# Patient Record
Sex: FEMALE | Race: WHITE | NOT HISPANIC OR LATINO | URBAN - METROPOLITAN AREA
[De-identification: names, ages, dates, MRNs, and addresses within clinical notes are randomized per-mention and may not be internally consistent; named-entity substitution may affect disease eponyms.]

---

## 2017-02-03 ENCOUNTER — OUTPATIENT (OUTPATIENT)
Dept: OUTPATIENT SERVICES | Facility: HOSPITAL | Age: 37
LOS: 1 days | Discharge: HOME | End: 2017-02-03

## 2017-06-27 DIAGNOSIS — Z31.69 ENCOUNTER FOR OTHER GENERAL COUNSELING AND ADVICE ON PROCREATION: ICD-10-CM

## 2017-10-19 ENCOUNTER — OUTPATIENT (OUTPATIENT)
Dept: OUTPATIENT SERVICES | Facility: HOSPITAL | Age: 37
LOS: 1 days | Discharge: HOME | End: 2017-10-19

## 2017-10-19 DIAGNOSIS — O99.89 OTHER SPECIFIED DISEASES AND CONDITIONS COMPLICATING PREGNANCY, CHILDBIRTH AND THE PUERPERIUM: ICD-10-CM

## 2017-10-19 DIAGNOSIS — O13.9 GESTATIONAL [PREGNANCY-INDUCED] HYPERTENSION WITHOUT SIGNIFICANT PROTEINURIA, UNSPECIFIED TRIMESTER: ICD-10-CM

## 2017-10-21 ENCOUNTER — OUTPATIENT (OUTPATIENT)
Dept: OUTPATIENT SERVICES | Facility: HOSPITAL | Age: 37
LOS: 1 days | Discharge: HOME | End: 2017-10-21

## 2017-10-21 DIAGNOSIS — O13.9 GESTATIONAL [PREGNANCY-INDUCED] HYPERTENSION WITHOUT SIGNIFICANT PROTEINURIA, UNSPECIFIED TRIMESTER: ICD-10-CM

## 2017-10-21 DIAGNOSIS — O99.89 OTHER SPECIFIED DISEASES AND CONDITIONS COMPLICATING PREGNANCY, CHILDBIRTH AND THE PUERPERIUM: ICD-10-CM

## 2017-10-21 DIAGNOSIS — Z34.00 ENCOUNTER FOR SUPERVISION OF NORMAL FIRST PREGNANCY, UNSPECIFIED TRIMESTER: ICD-10-CM

## 2017-11-14 ENCOUNTER — INPATIENT (INPATIENT)
Facility: HOSPITAL | Age: 37
LOS: 1 days | Discharge: HOME | End: 2017-11-16
Attending: OBSTETRICS & GYNECOLOGY | Admitting: OBSTETRICS & GYNECOLOGY

## 2017-11-14 DIAGNOSIS — O99.89 OTHER SPECIFIED DISEASES AND CONDITIONS COMPLICATING PREGNANCY, CHILDBIRTH AND THE PUERPERIUM: ICD-10-CM

## 2017-11-14 DIAGNOSIS — O13.9 GESTATIONAL [PREGNANCY-INDUCED] HYPERTENSION WITHOUT SIGNIFICANT PROTEINURIA, UNSPECIFIED TRIMESTER: ICD-10-CM

## 2017-11-20 DIAGNOSIS — I10 ESSENTIAL (PRIMARY) HYPERTENSION: ICD-10-CM

## 2017-11-20 DIAGNOSIS — Z3A.38 38 WEEKS GESTATION OF PREGNANCY: ICD-10-CM

## 2020-02-18 PROBLEM — Z00.00 ENCOUNTER FOR PREVENTIVE HEALTH EXAMINATION: Status: ACTIVE | Noted: 2020-02-18

## 2020-02-19 ENCOUNTER — TRANSCRIPTION ENCOUNTER (OUTPATIENT)
Age: 40
End: 2020-02-19

## 2020-02-19 ENCOUNTER — APPOINTMENT (OUTPATIENT)
Dept: OBGYN | Facility: CLINIC | Age: 40
End: 2020-02-19
Payer: COMMERCIAL

## 2020-02-19 VITALS
DIASTOLIC BLOOD PRESSURE: 80 MMHG | BODY MASS INDEX: 21.62 KG/M2 | HEIGHT: 63 IN | WEIGHT: 122 LBS | SYSTOLIC BLOOD PRESSURE: 128 MMHG

## 2020-02-19 DIAGNOSIS — Z86.19 PERSONAL HISTORY OF OTHER INFECTIOUS AND PARASITIC DISEASES: ICD-10-CM

## 2020-02-19 DIAGNOSIS — N64.3 GALACTORRHEA NOT ASSOCIATED WITH CHILDBIRTH: ICD-10-CM

## 2020-02-19 DIAGNOSIS — Z87.42 PERSONAL HISTORY OF OTHER DISEASES OF THE FEMALE GENITAL TRACT: ICD-10-CM

## 2020-02-19 DIAGNOSIS — Z78.9 OTHER SPECIFIED HEALTH STATUS: ICD-10-CM

## 2020-02-19 DIAGNOSIS — N89.8 OTHER SPECIFIED NONINFLAMMATORY DISORDERS OF VAGINA: ICD-10-CM

## 2020-02-19 PROCEDURE — 99395 PREV VISIT EST AGE 18-39: CPT

## 2020-02-19 PROCEDURE — 99213 OFFICE O/P EST LOW 20 MIN: CPT | Mod: 25

## 2020-02-19 NOTE — PHYSICAL EXAM
[Acute Distress] : no acute distress [Alert] : alert [Awake] : awake [Mass] : no breast mass [Nipple Discharge] : nipple discharge [Soft] : soft [Axillary LAD] : no axillary lymphadenopathy [Tender] : non tender [Oriented x3] : oriented to person, place, and time [Normal] : uterus [No Bleeding] : there was no active vaginal bleeding [Uterine Adnexae] : were not tender and not enlarged

## 2020-02-20 DIAGNOSIS — B96.89 ACUTE VAGINITIS: ICD-10-CM

## 2020-02-20 DIAGNOSIS — N76.0 ACUTE VAGINITIS: ICD-10-CM

## 2020-12-23 PROBLEM — Z86.19 HISTORY OF CANDIDAL VULVOVAGINITIS: Status: RESOLVED | Noted: 2020-02-19 | Resolved: 2020-12-23

## 2020-12-23 PROBLEM — N76.0 BACTERIAL VAGINOSIS: Status: RESOLVED | Noted: 2020-02-20 | Resolved: 2020-12-23

## 2021-08-06 ENCOUNTER — APPOINTMENT (OUTPATIENT)
Dept: OBGYN | Facility: CLINIC | Age: 41
End: 2021-08-06
Payer: COMMERCIAL

## 2021-08-06 VITALS
WEIGHT: 122 LBS | BODY MASS INDEX: 21.62 KG/M2 | DIASTOLIC BLOOD PRESSURE: 72 MMHG | SYSTOLIC BLOOD PRESSURE: 118 MMHG | HEIGHT: 63 IN

## 2021-08-06 DIAGNOSIS — R82.998 OTHER ABNORMAL FINDINGS IN URINE: ICD-10-CM

## 2021-08-06 DIAGNOSIS — Z01.411 ENCOUNTER FOR GYNECOLOGICAL EXAMINATION (GENERAL) (ROUTINE) WITH ABNORMAL FINDINGS: ICD-10-CM

## 2021-08-06 DIAGNOSIS — N39.0 URINARY TRACT INFECTION, SITE NOT SPECIFIED: ICD-10-CM

## 2021-08-06 DIAGNOSIS — R31.29 OTHER MICROSCOPIC HEMATURIA: ICD-10-CM

## 2021-08-06 LAB
APPEARANCE: CLEAR
BILIRUBIN URINE: NORMAL
BLOOD URINE: NORMAL
COLOR: YELLOW
GLUCOSE QUALITATIVE U: NORMAL
KETONES URINE: NORMAL
LEUKOCYTE ESTERASE URINE: NORMAL
NITRITE URINE: NORMAL
PH URINE: 5.5
PROTEIN URINE: NORMAL
SPECIFIC GRAVITY URINE: 1.02
URINALYSIS, DIPSTICK: NORMAL
UROBILINOGEN URINE: NORMAL

## 2021-08-06 PROCEDURE — 99213 OFFICE O/P EST LOW 20 MIN: CPT | Mod: 25

## 2021-08-06 PROCEDURE — 81002 URINALYSIS NONAUTO W/O SCOPE: CPT

## 2021-08-06 PROCEDURE — 99395 PREV VISIT EST AGE 18-39: CPT

## 2021-08-06 NOTE — PROCEDURE
[Cervical Pap Smear] : cervical Pap smear [Liquid Base] : liquid base [Tolerated Well] : the patient tolerated the procedure well [No Complications] : there were no complications [Pelvic Pain] : pelvic pain [Suspected Ovarian Cyst] : suspected ovarian cyst [Transvaginal Ultrasound] : transvaginal ultrasound [Retroverted] : retroverted [L: ___ cm] : L: [unfilled] cm [H: ___ cm] : H: [unfilled] cm [FreeTextEntry7] : Right ovarian cysts noted combined size equal to 6.1 x 3.8 cm [FreeTextEntry8] : Not visualized

## 2021-08-06 NOTE — HISTORY OF PRESENT ILLNESS
[FreeTextEntry1] : Patient is 41 years old para 2-0-0-2 last menstrual period July 24, 2021\par Patient states she that she is presently experiencing urinary symptoms including frequency, pelvic pressure and feeling of incomplete emptying of the bladder.  Urine dip notes moderate leukocytes and microscopic hematuria.  Patient also states that she notes that her periods changed from 28 to 26 days and are heavier.\par Patient states that her  was diagnosed with Hodgkins lymphoma and is presently in remission.

## 2021-08-06 NOTE — PHYSICAL EXAM
[Appropriately responsive] : appropriately responsive [Alert] : alert [No Acute Distress] : no acute distress [No Lymphadenopathy] : no lymphadenopathy [Regular Rate Rhythm] : regular rate rhythm [No Murmurs] : no murmurs [Clear to Auscultation B/L] : clear to auscultation bilaterally [Soft] : soft [Non-tender] : non-tender [Non-distended] : non-distended [No HSM] : No HSM [No Lesions] : no lesions [No Mass] : no mass [Oriented x3] : oriented x3 [Examination Of The Breasts] : a normal appearance [No Masses] : no breast masses were palpable [Labia Majora] : normal [Labia Minora] : normal [Normal] : normal [Retroversion] : retroverted [Enlarged ___ wks] : enlarged [unfilled] ~Uweeks [Adnexa Tenderness On The Right] : tender  [Uterine Adnexae] : non-palpable

## 2021-08-06 NOTE — DISCUSSION/SUMMARY
[FreeTextEntry1] : Pap done\par Urine culture sent\par Prescribed hormone profile with CA-125 and CEA\par Prescribed pelvic ultrasound\par Issues regarding large ovarian cyst discussed with patient including various etiologies, diagnostic and treatment options.\par Torsion precautions discussed with patient\par Keep menstrual calendar

## 2021-09-13 NOTE — PAST MEDICAL HISTORY
[Menstruating] : The patient is menstruating [Menarche Age ____] : age at menarche was [unfilled] [Approximately ___] : the LMP was approximately [unfilled] [Total Preg ___] : G[unfilled] [Live Births ___] : P[unfilled]  [Full Term ___] : Full Term: [unfilled] [Living ___] : Living: [unfilled]

## 2021-09-13 NOTE — OB HISTORY
[Total Preg ___] : : [unfilled] [Full Term ___] : [unfilled] (full-term) [Vaginal ___] : [unfilled] vaginal delivery(s) [Approximately ___ (Month)] : the LMP was approximately [unfilled] month(s) ago [Menarche Age ____] : age at menarche was [unfilled]

## 2021-09-17 ENCOUNTER — APPOINTMENT (OUTPATIENT)
Dept: GYNECOLOGIC ONCOLOGY | Facility: CLINIC | Age: 41
End: 2021-09-17
Payer: COMMERCIAL

## 2021-09-17 VITALS
BODY MASS INDEX: 21.26 KG/M2 | WEIGHT: 120 LBS | HEIGHT: 63 IN | TEMPERATURE: 96.3 F | SYSTOLIC BLOOD PRESSURE: 140 MMHG | DIASTOLIC BLOOD PRESSURE: 72 MMHG

## 2021-09-17 DIAGNOSIS — R10.2 PELVIC AND PERINEAL PAIN: ICD-10-CM

## 2021-09-17 DIAGNOSIS — Z80.3 FAMILY HISTORY OF MALIGNANT NEOPLASM OF BREAST: ICD-10-CM

## 2021-09-17 PROCEDURE — 99205 OFFICE O/P NEW HI 60 MIN: CPT

## 2021-09-17 NOTE — HISTORY OF PRESENT ILLNESS
[FreeTextEntry1] : 41 year old patient  ( x2), referred by Dr. Harvey Michael for further management of bilateral ovarian masses.  (4-5 cm complex left ovarian cyst, along with a large 7.1 x 5.8 left paraovarian cyst, Right ovary also noted to have a 2.4 complex cyst).  CA-125 is 33.  The patient states she has mild endometriosis.  Ms. Cardenas has a Maternal Grandmother  diagnosed with breast cancer post menopause.

## 2021-09-17 NOTE — ASSESSMENT
[FreeTextEntry1] : 41 year old patient  ( x2), referred by Dr. Harvey Michael for further management of bilateral ovarian masses.  (4-5 cm complex left ovarian cyst, along with a large 7.1 x 5.8 left paraovarian cyst.  Right ovary also noted to have a 2.4 complex cyst).  CA-125 is 33.  The patient states she has mild endometriosis.  She is requesting further management and prefers to keep at least one ovary.

## 2021-09-27 ENCOUNTER — OUTPATIENT (OUTPATIENT)
Dept: OUTPATIENT SERVICES | Facility: HOSPITAL | Age: 41
LOS: 1 days | Discharge: HOME | End: 2021-09-27

## 2021-09-27 VITALS
RESPIRATION RATE: 16 BRPM | HEIGHT: 63 IN | TEMPERATURE: 98 F | HEART RATE: 94 BPM | OXYGEN SATURATION: 99 % | SYSTOLIC BLOOD PRESSURE: 122 MMHG | DIASTOLIC BLOOD PRESSURE: 88 MMHG | WEIGHT: 117.95 LBS

## 2021-09-27 DIAGNOSIS — Z01.818 ENCOUNTER FOR OTHER PREPROCEDURAL EXAMINATION: ICD-10-CM

## 2021-09-27 DIAGNOSIS — N83.201 UNSPECIFIED OVARIAN CYST, RIGHT SIDE: ICD-10-CM

## 2021-09-27 DIAGNOSIS — N83.292 OTHER OVARIAN CYST, LEFT SIDE: ICD-10-CM

## 2021-09-27 DIAGNOSIS — Z98.890 OTHER SPECIFIED POSTPROCEDURAL STATES: Chronic | ICD-10-CM

## 2021-09-27 LAB
ALBUMIN SERPL ELPH-MCNC: 4.9 G/DL — SIGNIFICANT CHANGE UP (ref 3.5–5.2)
ALP SERPL-CCNC: 63 U/L — SIGNIFICANT CHANGE UP (ref 30–115)
ALT FLD-CCNC: 21 U/L — SIGNIFICANT CHANGE UP (ref 0–41)
ANION GAP SERPL CALC-SCNC: 17 MMOL/L — HIGH (ref 7–14)
AST SERPL-CCNC: 19 U/L — SIGNIFICANT CHANGE UP (ref 0–41)
BASOPHILS # BLD AUTO: 0.05 K/UL — SIGNIFICANT CHANGE UP (ref 0–0.2)
BASOPHILS NFR BLD AUTO: 0.7 % — SIGNIFICANT CHANGE UP (ref 0–1)
BILIRUB SERPL-MCNC: 0.3 MG/DL — SIGNIFICANT CHANGE UP (ref 0.2–1.2)
BUN SERPL-MCNC: 13 MG/DL — SIGNIFICANT CHANGE UP (ref 10–20)
CALCIUM SERPL-MCNC: 10.3 MG/DL — HIGH (ref 8.5–10.1)
CHLORIDE SERPL-SCNC: 101 MMOL/L — SIGNIFICANT CHANGE UP (ref 98–110)
CO2 SERPL-SCNC: 22 MMOL/L — SIGNIFICANT CHANGE UP (ref 17–32)
CREAT SERPL-MCNC: 0.7 MG/DL — SIGNIFICANT CHANGE UP (ref 0.7–1.5)
EOSINOPHIL # BLD AUTO: 0.02 K/UL — SIGNIFICANT CHANGE UP (ref 0–0.7)
EOSINOPHIL NFR BLD AUTO: 0.3 % — SIGNIFICANT CHANGE UP (ref 0–8)
GLUCOSE SERPL-MCNC: 77 MG/DL — SIGNIFICANT CHANGE UP (ref 70–99)
HCT VFR BLD CALC: 40.2 % — SIGNIFICANT CHANGE UP (ref 37–47)
HGB BLD-MCNC: 13.1 G/DL — SIGNIFICANT CHANGE UP (ref 12–16)
IMM GRANULOCYTES NFR BLD AUTO: 0.3 % — SIGNIFICANT CHANGE UP (ref 0.1–0.3)
LYMPHOCYTES # BLD AUTO: 1.55 K/UL — SIGNIFICANT CHANGE UP (ref 1.2–3.4)
LYMPHOCYTES # BLD AUTO: 21.8 % — SIGNIFICANT CHANGE UP (ref 20.5–51.1)
MCHC RBC-ENTMCNC: 28.7 PG — SIGNIFICANT CHANGE UP (ref 27–31)
MCHC RBC-ENTMCNC: 32.6 G/DL — SIGNIFICANT CHANGE UP (ref 32–37)
MCV RBC AUTO: 88 FL — SIGNIFICANT CHANGE UP (ref 81–99)
MONOCYTES # BLD AUTO: 0.46 K/UL — SIGNIFICANT CHANGE UP (ref 0.1–0.6)
MONOCYTES NFR BLD AUTO: 6.5 % — SIGNIFICANT CHANGE UP (ref 1.7–9.3)
NEUTROPHILS # BLD AUTO: 5.02 K/UL — SIGNIFICANT CHANGE UP (ref 1.4–6.5)
NEUTROPHILS NFR BLD AUTO: 70.4 % — SIGNIFICANT CHANGE UP (ref 42.2–75.2)
NRBC # BLD: 0 /100 WBCS — SIGNIFICANT CHANGE UP (ref 0–0)
PLATELET # BLD AUTO: 304 K/UL — SIGNIFICANT CHANGE UP (ref 130–400)
POTASSIUM SERPL-MCNC: 4.1 MMOL/L — SIGNIFICANT CHANGE UP (ref 3.5–5)
POTASSIUM SERPL-SCNC: 4.1 MMOL/L — SIGNIFICANT CHANGE UP (ref 3.5–5)
PROT SERPL-MCNC: 8 G/DL — SIGNIFICANT CHANGE UP (ref 6–8)
RBC # BLD: 4.57 M/UL — SIGNIFICANT CHANGE UP (ref 4.2–5.4)
RBC # FLD: 13.2 % — SIGNIFICANT CHANGE UP (ref 11.5–14.5)
SODIUM SERPL-SCNC: 140 MMOL/L — SIGNIFICANT CHANGE UP (ref 135–146)
WBC # BLD: 7.12 K/UL — SIGNIFICANT CHANGE UP (ref 4.8–10.8)
WBC # FLD AUTO: 7.12 K/UL — SIGNIFICANT CHANGE UP (ref 4.8–10.8)

## 2021-09-27 NOTE — H&P PST ADULT - HISTORY OF PRESENT ILLNESS
41YR old female states b/l groin pain LT worse than RT -"Wraps around the LT hip and back  for about 1yr- ultrasound showed a large left ovarian cyst and 2 small RT ovarian cysts" presents to pretesting is scheduled for Examination Under Anesthesia/Laparaoscopic ovarian cystectomies/possible oophorectomy and all other pro cedures indicated. Denies COVID S/S. Recd 2 doses of COVID S/S. Verbalized understanding of COVID prevention measures. Exercise sheila 3 FOS.  Anesthesia Alert  NO--Difficult Airway  NO--History of neck surgery or radiation  NO--Limited ROM of neck  NO--History of Malignant hyperthermia  NO--Personal or family history of Pseudocholinesterase deficiency  NO--Prior Anesthesia Complication  NO--Latex Allergy  NO--Loose teeth  NO--History of Rheumatoid Arthritis  NO--AMBAR  No Bleeding risk  NO--Other_____

## 2021-09-27 NOTE — H&P PST ADULT - NSICDXPASTSURGICALHX_GEN_ALL_CORE_FT
PAST SURGICAL HISTORY:  History of esophagogastroduodenoscopy (EGD)      product of IVF pregnancy ?

## 2021-09-27 NOTE — H&P PST ADULT - NSANTHOSAYNRD_GEN_A_CORE
No. AMBAR screening performed.  STOP BANG Legend: 0-2 = LOW Risk; 3-4 = INTERMEDIATE Risk; 5-8 = HIGH Risk

## 2021-09-27 NOTE — H&P PST ADULT - ATTENDING COMMENTS
41 year old patient  ( x2), referred by Dr. Harvey Michael for further management of bilateral ovarian masses. (4-5 cm complex left ovarian cyst, along with a large 7.1 x 5.8 left paraovarian cyst. Right ovary also noted to have a 2.4 complex cyst). CA-125 is 33. The patient states she has mild endometriosis. She is requesting further management and prefers to keep at least one ovary.   Assessment  Complex cyst of left ovary (620.2) (N83.292)  Cyst of right ovary (620.2) (N83.201)  Family history of malignant neoplasm of breast (V16.3) (Z80.3) : Maternal Grandmother  Pelvic pressure in female (625.8) (R10.2)  Plan   Set up for EUA diagnostic laparoscopy bilateral ovarian cystectomies, possible salpingo-oophorectomy, possible surgical staging and all indicated procedures.  Options for surgical management discussed. Procedures offered patient included laparoscopic hysterectomy with bilateral salpingectomies along with possible bilateral oophorectomies, versus laparoscopic ovarian cystectomies.  She is requesting to preserve her ovaries and is opting for cystectomies.

## 2021-10-08 ENCOUNTER — OUTPATIENT (OUTPATIENT)
Dept: OUTPATIENT SERVICES | Facility: HOSPITAL | Age: 41
LOS: 1 days | Discharge: HOME | End: 2021-10-08

## 2021-10-08 DIAGNOSIS — Z98.890 OTHER SPECIFIED POSTPROCEDURAL STATES: Chronic | ICD-10-CM

## 2021-10-08 DIAGNOSIS — Z11.59 ENCOUNTER FOR SCREENING FOR OTHER VIRAL DISEASES: ICD-10-CM

## 2021-10-08 PROBLEM — N83.209 UNSPECIFIED OVARIAN CYST, UNSPECIFIED SIDE: Chronic | Status: ACTIVE | Noted: 2021-09-27

## 2021-10-08 PROBLEM — N80.9 ENDOMETRIOSIS, UNSPECIFIED: Chronic | Status: ACTIVE | Noted: 2021-09-27

## 2021-10-09 ENCOUNTER — TRANSCRIPTION ENCOUNTER (OUTPATIENT)
Age: 41
End: 2021-10-09

## 2021-10-10 ENCOUNTER — RESULT REVIEW (OUTPATIENT)
Age: 41
End: 2021-10-10

## 2021-10-11 ENCOUNTER — OUTPATIENT (OUTPATIENT)
Dept: OUTPATIENT SERVICES | Facility: HOSPITAL | Age: 41
LOS: 1 days | Discharge: HOME | End: 2021-10-11
Payer: COMMERCIAL

## 2021-10-11 ENCOUNTER — APPOINTMENT (OUTPATIENT)
Dept: GYNECOLOGIC ONCOLOGY | Facility: HOSPITAL | Age: 41
End: 2021-10-11
Payer: COMMERCIAL

## 2021-10-11 ENCOUNTER — RESULT REVIEW (OUTPATIENT)
Age: 41
End: 2021-10-11

## 2021-10-11 VITALS
SYSTOLIC BLOOD PRESSURE: 124 MMHG | HEART RATE: 80 BPM | OXYGEN SATURATION: 100 % | RESPIRATION RATE: 17 BRPM | DIASTOLIC BLOOD PRESSURE: 64 MMHG | TEMPERATURE: 97 F

## 2021-10-11 VITALS
RESPIRATION RATE: 18 BRPM | HEART RATE: 107 BPM | SYSTOLIC BLOOD PRESSURE: 140 MMHG | TEMPERATURE: 99 F | HEIGHT: 63 IN | DIASTOLIC BLOOD PRESSURE: 77 MMHG | WEIGHT: 119.93 LBS

## 2021-10-11 DIAGNOSIS — Z98.890 OTHER SPECIFIED POSTPROCEDURAL STATES: Chronic | ICD-10-CM

## 2021-10-11 DIAGNOSIS — N80.1 ENDOMETRIOSIS OF OVARY: ICD-10-CM

## 2021-10-11 DIAGNOSIS — N80.2 ENDOMETRIOSIS OF FALLOPIAN TUBE: ICD-10-CM

## 2021-10-11 DIAGNOSIS — N83.12 CORPUS LUTEUM CYST OF LEFT OVARY: ICD-10-CM

## 2021-10-11 DIAGNOSIS — N73.6 FEMALE PELVIC PERITONEAL ADHESIONS (POSTINFECTIVE): ICD-10-CM

## 2021-10-11 DIAGNOSIS — N83.02 FOLLICULAR CYST OF LEFT OVARY: ICD-10-CM

## 2021-10-11 DIAGNOSIS — R10.2 PELVIC AND PERINEAL PAIN: ICD-10-CM

## 2021-10-11 LAB — BLD GP AB SCN SERPL QL: SIGNIFICANT CHANGE UP

## 2021-10-11 PROCEDURE — 88305 TISSUE EXAM BY PATHOLOGIST: CPT | Mod: 26

## 2021-10-11 PROCEDURE — 88112 CYTOPATH CELL ENHANCE TECH: CPT | Mod: 26

## 2021-10-11 PROCEDURE — 58662 LAPAROSCOPY EXCISE LESIONS: CPT | Mod: 22,59

## 2021-10-11 PROCEDURE — 58661 LAPAROSCOPY REMOVE ADNEXA: CPT | Mod: 22

## 2021-10-11 RX ORDER — ONDANSETRON 8 MG/1
4 TABLET, FILM COATED ORAL ONCE
Refills: 0 | Status: DISCONTINUED | OUTPATIENT
Start: 2021-10-11 | End: 2021-10-11

## 2021-10-11 RX ORDER — OXYCODONE HYDROCHLORIDE 5 MG/1
1 TABLET ORAL
Qty: 12 | Refills: 0
Start: 2021-10-11

## 2021-10-11 RX ORDER — SIMETHICONE 80 MG/1
1 TABLET, CHEWABLE ORAL
Qty: 90 | Refills: 0
Start: 2021-10-11 | End: 2021-10-25

## 2021-10-11 RX ORDER — SENNA PLUS 8.6 MG/1
1 TABLET ORAL
Qty: 30 | Refills: 0
Start: 2021-10-11 | End: 2021-11-09

## 2021-10-11 RX ORDER — SODIUM CHLORIDE 9 MG/ML
1000 INJECTION, SOLUTION INTRAVENOUS
Refills: 0 | Status: DISCONTINUED | OUTPATIENT
Start: 2021-10-11 | End: 2021-10-11

## 2021-10-11 RX ORDER — HYDROMORPHONE HYDROCHLORIDE 2 MG/ML
0.5 INJECTION INTRAMUSCULAR; INTRAVENOUS; SUBCUTANEOUS
Refills: 0 | Status: DISCONTINUED | OUTPATIENT
Start: 2021-10-11 | End: 2021-10-11

## 2021-10-11 RX ORDER — IBUPROFEN 200 MG
1 TABLET ORAL
Qty: 120 | Refills: 0
Start: 2021-10-11 | End: 2021-11-09

## 2021-10-11 RX ORDER — ACETAMINOPHEN 500 MG
1 TABLET ORAL
Qty: 120 | Refills: 0
Start: 2021-10-11 | End: 2021-11-09

## 2021-10-11 RX ORDER — HYDROMORPHONE HYDROCHLORIDE 2 MG/ML
1 INJECTION INTRAMUSCULAR; INTRAVENOUS; SUBCUTANEOUS
Refills: 0 | Status: DISCONTINUED | OUTPATIENT
Start: 2021-10-11 | End: 2021-10-11

## 2021-10-11 NOTE — ASU PATIENT PROFILE, ADULT - BRADEN SCORE (IF 18 OR LESS ACTIVATE SKIN INJURY RISK INCREASED GUIDELINE), MLM
You can access the FollowMyHealth Patient Portal offered by [facility] by registering at the following website: [email or web address]. By joining [employer]’s FollowMyHealth portal, you will also be able to view your health information using other applications (apps) compatible with our system. 22

## 2021-10-11 NOTE — BRIEF OPERATIVE NOTE - NSICDXBRIEFPROCEDURE_GEN_ALL_CORE_FT
PROCEDURES:  Laparoscopic salpingo-oophorectomy, left 11-Oct-2021 10:05:25  Rafaela Farah  Laparoscopic lysis of adhesions with salpingo-oophorectomy 11-Oct-2021 10:05:37  Rafaela Farah

## 2021-10-11 NOTE — ASU PREOP CHECKLIST - LATEX ALLERGY
no
PAST SURGICAL HISTORY:  H/O detached retina repair 1952    H/O foot surgery for infection of right foot 2019    H/O inguinal hernia repair right 1993    H/O varicose vein stripping 1993 left leg    History of hip surgery left hip ORIF    History of left knee replacement 2013 - multiple infections post op requiring reoperation in 2015, 2017, 2019)    Pacemaker Medtronic - Model RVDR01 1/10/2012    S/P cataract surgery     Stented coronary artery drug eluding stent 5/2007

## 2021-10-11 NOTE — BRIEF OPERATIVE NOTE - NSICDXBRIEFPOSTOP_GEN_ALL_CORE_FT
POST-OP DIAGNOSIS:  Left ovarian cyst 11-Oct-2021 10:06:08  Rafaela Farah  Endometriosis 11-Oct-2021 10:06:21 final pathology pending Rafaela Farah

## 2021-10-11 NOTE — CHART NOTE - NSCHARTNOTEFT_GEN_A_CORE
PACU ANESTHESIA ADMISSION NOTE      Procedure: EUA, KATHRIN, Laparoscopic LSO  Post op diagnosis:  endometriosis      __x__  Patent Airway    _x___  Full return of protective reflexes    __x__  Full recovery from anesthesia / back to baseline     Vitals: see anesthesia record      Mental Status:  __x__ Awake   _____ Alert   _____ Drowsy   _____ Sedated    Nausea/Vomiting:  xNO  ______Yes,   See Post - Op Orders          Pain Scale (0-10):  _____    Treatment: ____ None    ___x_ See Post - Op/PCA Orders    Post - Operative Fluids:   ____ Oral   __x__ See Post - Op Orders    Plan: Discharge:   _x___Home       _____Floor     _____Critical Care    _____  Other:_________________    Comments:  Uneventful intraoperative course. No anesthesia issues or complications noted. Patient stable upon arrival to PACU. Report given to RN. Discharge when criteria met.

## 2021-10-11 NOTE — ASU DISCHARGE PLAN (ADULT/PEDIATRIC) - ASU DC SPECIAL INSTRUCTIONSFT
DIET  - You may resume your normal diet. Eat a well-balanced diet. You may prefer to eat light meals for the first few days after surgery.  Drink plenty of water (6-8 glasses a day).    - Please take Senna (stool softener) daily until you have normal bowel movements.  If you have constipation or don't have a bowel movement 2-3 days after surgery, please try a mild laxative such as Miralax.     ACTIVITY:   - No heavy lifting/pushing/pulling for 4 weeks. Do not lift anything more than 10 lbs (such as laundry, groceries, children, pets), vacuum, push heavy doors or grocery carts, etc, for 4 weeks.  - You may climb stairs as tolerated.  -  Do not put anything in the vagina for at least 4 weeks after surgery unless otherwise instructed by your doctor (including tampons, douching, sexual intercourse, etc).  -  No driving while taking narcotic pain medication. Drive defensively when you are ready.  -  Avoid sitting or lying in bed for more than 2 hours at a time while you are awake to reduce your risk of blood clots.    WOUND CARE: You have 4 incisions that are covered with surgical glue (Dermabond).  After 24 hours you may get your incisions wet.  Do not submerge in water (no tub baths or pools), showering is OK.  The Dermabond will loosen from the skin and fall off in 5 to 10 days.  Do not apply any ointments, lotions, creams or tape over the Dermabond.    PAIN MANAGEMENT:   Alternate Tylenol and ibuprofen/Motrin (if you are eligible). Each of these medications can be taken every six hours. Try to stagger them so that you are taking something for pain every three hours (ex. Take Motrin at 12:00, Tylenol at 3:00, Motrin at 6:00, etc.) to maximize pain relief.  - Tylenol – 500mg every 6 hours as needed. The maximum dose of Tylenol is 3000 mg in 24 hours.  - Motrin/Ibuprofen - 600 mg every 6 hours as needed (try to take with food). The maximum dose of Motrin/ibuprofen is 2400mg in 24 hours  - Oxycodone 5mg every 6 hours as needed for severe pain (limit use as this is a narcotic and can cause sedation, nausea and constipation.  -  A warm shower, heating pad, and/or walking may help.    WHAT TO EXPECT AT HOME  - Recovery from surgery is generally 2-4 weeks, but sometimes longer for more strenuous activity. It is normal to be very tired during this time.  - It is normal to have small amount of vaginal spotting after surgery that would require the use of a light pantiliner.   - You may experience gas pain, abdominal swelling, or shoulder pain for 24-72 hours after surgery. This is from the carbon dioxide gas put into your abdomen to better visualize your organs. A warm shower, heating pad, and/or walking may help.    WHEN TO CALL YOUR DOCTOR:  - Fever (>100.4°F or 38.0°C) or chills  - Incision problems such as redness, warmth, swelling, or foul smelling drainage.  - Severe nausea or persistent vomiting.  - Bright red vaginal bleeding (soaking >1 pad/hour) or foul smelling vaginal drainage.  - Severe pain not relieved with pain medication.  - Pain with urination, cloudy urine, or foul smelling urine.  - Or if you have any other problems or questions.

## 2021-10-11 NOTE — BRIEF OPERATIVE NOTE - TYPE OF ANESTHESIA
ACM introduction letter sent vis [employer]. ACM will attempt telephonic outreach next. Truman Estrada RN  Ambulatory Care Manager  [phone] office/cell  [phone] fax  [email or web address]
General

## 2021-10-11 NOTE — BRIEF OPERATIVE NOTE - NSICDXBRIEFPREOP_GEN_ALL_CORE_FT
PRE-OP DIAGNOSIS:  Pain pelvic 11-Oct-2021 10:05:46  Rafaela Farah  Ovarian cyst 11-Oct-2021 10:05:56  Rafaela Farah

## 2021-10-11 NOTE — BRIEF OPERATIVE NOTE - OPERATION/FINDINGS
Exam under anesthesia normal external female genitalia, normal vaginal mucosa, multiparous cervix.  Uterus small and mobile. Left adnexal fullness palpated.  No adnexal mass appreciated on right. On laparoscopy, normal upper abdomen, liver edge, stomach and visualized portion of intestines. Normal appearing appendix. Uterus small with smooth serosa.  Adhesions between descending colon, left pelvic sidewall and left IP.  Left ovary enlarged to approximately 5cm with cyst - no surface nodularity - chocolate cyst fluid evacuated consistent with endometrioma. Left fallopian tube dilated and densely adherent to ovary. Right ovary normal appearing with adhesions to ovarian fossa.  Normal appearing right fallopian tube. Lesion of possible endometriosis in right posterior cul-de-sac.  Intraoperative frozen section benign with foci of likely endometriosis. Lesion of likely endometriosis overlying left ureter. EUA, laparoscopic LSO, lysis of adhesions, left ureterolysis    Exam under anesthesia normal external female genitalia, normal vaginal mucosa, multiparous cervix.  Uterus small and mobile. Left adnexal fullness palpated.  No adnexal mass appreciated on right. On laparoscopy, normal upper abdomen, liver edge, stomach and visualized portion of intestines. Normal appearing appendix. Uterus small with smooth serosa.  Adhesions between descending colon, left pelvic sidewall and left IP.  Left ovary enlarged to approximately 5cm with cyst - no surface nodularity - chocolate cyst fluid evacuated consistent with endometrioma. Left fallopian tube dilated and densely adherent to ovary. Right ovary normal appearing with adhesions to ovarian fossa.  Normal appearing right fallopian tube. Lesion of possible endometriosis in right posterior cul-de-sac.  Intraoperative frozen section benign with foci of likely endometriosis. Lesion of likely endometriosis overlying left ureter.

## 2021-10-14 LAB
NON-GYNECOLOGICAL CYTOLOGY STUDY: SIGNIFICANT CHANGE UP
SURGICAL PATHOLOGY STUDY: SIGNIFICANT CHANGE UP

## 2021-10-18 NOTE — REASON FOR VISIT
[Post Op] : post op visit [de-identified] : October 11, 2021 [de-identified] : S/P Laproscopic Left salpingoophorectomy with frozen section [de-identified] : 41 year old patient of Dr. Michael.  ( x2)  S/P Lap Salpingophorectomy for complex left ovarian cyst. Intraoperative frozen section done.  Impression was endometriiosis. Final   Pathology reports cystic endometriosis of the ovary.  Fallopian tube showing foci of endometriosis.   Benign.  She presents for 1st post -op visit.

## 2021-11-02 ENCOUNTER — APPOINTMENT (OUTPATIENT)
Dept: GYNECOLOGIC ONCOLOGY | Facility: CLINIC | Age: 41
End: 2021-11-02
Payer: COMMERCIAL

## 2021-11-02 VITALS
DIASTOLIC BLOOD PRESSURE: 86 MMHG | SYSTOLIC BLOOD PRESSURE: 138 MMHG | WEIGHT: 120 LBS | TEMPERATURE: 97.5 F | BODY MASS INDEX: 21.26 KG/M2 | HEIGHT: 63 IN

## 2021-11-02 DIAGNOSIS — N83.201 UNSPECIFIED OVARIAN CYST, RIGHT SIDE: ICD-10-CM

## 2021-11-02 PROCEDURE — 99024 POSTOP FOLLOW-UP VISIT: CPT

## 2021-11-02 NOTE — ASSESSMENT
[FreeTextEntry1] : 41 year old patient of Dr. Michael.  S/P Lap Left Salpingo-oophorectomy with frozen section.  Final pathology reports cystic endometriosis of the left ovary.  Fallopian tube showing foci of endometriosis.  She presents for her first post op visit.  She appears to be recovering well.\par \par Surgical Pathology Report - Auth (Verified)\par \par Specimen(s) Submitted\par Left tube and ovary\par \par Final Diagnosis\par Left tube and ovary, salpingo-oophorectomy:\par - Cystic endometriosis of the ovary (endometriotic cyst).\par - Small follicular cysts and corpus luteum cyst are also present.\par - Fallopian tube showing foci of endometriosis along the serosal surface.\par \par Verified by: Cecy Bullard M.D.\par (Electronic Signature)\par

## 2021-11-02 NOTE — REASON FOR VISIT
[Post Op] : post op visit [de-identified] : October 11, 2021 [de-identified] : Lap LSO with frozen section

## 2021-11-02 NOTE — DISCUSSION/SUMMARY
[Clean] : was clean [Dry] : was dry [Intact] : was intact [Erythema] : was not erythematous [Ecchymosis] : was not ecchymotic [Seroma] : had no seroma [Sutures Intact] : had sutures  intact [None] : had no drainage [Serous] : had serous drainage [Normal Skin] : normal appearance [Firm] : soft [Tender] : nontender [Abnormal Bowel Sounds] : normal bowel sounds [Rebound] : no rebound tenderness [Guarding] : no guarding [Incisional Hernia] : no incisional hernia [Mass] : no palpable mass [Cervical Abnormality] : normal cervix [External Genitalia Abnormal] : normal external genitalia [Vaginal Exam Abnormal] : normal vaginal exam [Doing Well] : is doing well

## 2021-12-15 ENCOUNTER — APPOINTMENT (OUTPATIENT)
Dept: OBGYN | Facility: CLINIC | Age: 41
End: 2021-12-15
Payer: COMMERCIAL

## 2021-12-15 VITALS
BODY MASS INDEX: 21.26 KG/M2 | SYSTOLIC BLOOD PRESSURE: 124 MMHG | WEIGHT: 120 LBS | HEIGHT: 63 IN | DIASTOLIC BLOOD PRESSURE: 78 MMHG

## 2021-12-15 DIAGNOSIS — N83.292 OTHER OVARIAN CYST, LEFT SIDE: ICD-10-CM

## 2021-12-15 PROCEDURE — 99214 OFFICE O/P EST MOD 30 MIN: CPT

## 2021-12-15 NOTE — HISTORY OF PRESENT ILLNESS
[FreeTextEntry1] : Patient is 41 years old para 2-0-0-2 last menstrual period December 1, 2021.\par She is status post laparoscopic left salpingo-oophorectomy secondary to complex ovarian cyst.  Pathology revealed cystic endometriosis of the ovary and fallopian tube showing foci of endometriosis.\par Patient is here to discuss postoperative treatment options.  Patient states that she is not interested in future childbearing.

## 2021-12-15 NOTE — REASON FOR VISIT
[Follow-Up] : a follow-up evaluation of [Abnormal Uterine Bleeding] : abnormal uterine bleeding [Pelvic Pain] : pelvic pain [FreeTextEntry2] : s/p lap LSO

## 2021-12-15 NOTE — DISCUSSION/SUMMARY
[FreeTextEntry1] : Issues regarding postoperative treatment options of endometriosis discussed with patient\par Treatment options discussed included but was not limited to Lupron, oral contraceptives, progestin only regimen including Mirena intrauterine device.\par She is considering a consultation with a reproductive endocrinologist regarding treatment options prior to initiation of treatment\par Keep menstrual calendar\par Baseline bilateral screening mammogram

## 2022-02-17 ENCOUNTER — NON-APPOINTMENT (OUTPATIENT)
Age: 42
End: 2022-02-17

## 2022-02-17 ENCOUNTER — APPOINTMENT (OUTPATIENT)
Dept: OBGYN | Facility: CLINIC | Age: 42
End: 2022-02-17
Payer: COMMERCIAL

## 2022-02-17 VITALS
TEMPERATURE: 98.6 F | WEIGHT: 120 LBS | BODY MASS INDEX: 21.26 KG/M2 | SYSTOLIC BLOOD PRESSURE: 156 MMHG | DIASTOLIC BLOOD PRESSURE: 84 MMHG | HEIGHT: 63 IN | HEART RATE: 81 BPM

## 2022-02-17 PROCEDURE — 99213 OFFICE O/P EST LOW 20 MIN: CPT | Mod: 25

## 2022-02-17 PROCEDURE — 76830 TRANSVAGINAL US NON-OB: CPT

## 2022-02-17 NOTE — PROCEDURE
[Transvaginal Ultrasound] : transvaginal ultrasound [FreeTextEntry3] : COPIOUS FREE FLUID\par CERVIX NORMAL \par RETROVERSION [FreeTextEntry5] : 126CC RV,    9.2MM [FreeTextEntry7] : 14.3CC, CYSTIC FOLLICLES [FreeTextEntry8] : ABSENT///LSO

## 2022-02-17 NOTE — HISTORY OF PRESENT ILLNESS
[PGHxTotal] : 2 [FreeTextEntry1] : 2 'S...IVF CYCLES [[facility] Corporation[facility]] : 2

## 2022-02-17 NOTE — PHYSICAL EXAM
[Labia Majora] : normal [Labia Minora] : normal [Normal] : normal [Retroversion] : retroverted [Enlarged ___ wks] : enlarged [unfilled] ~Uweeks [Uterine Adnexae] : normal

## 2022-03-21 ENCOUNTER — NON-APPOINTMENT (OUTPATIENT)
Age: 42
End: 2022-03-21

## 2022-03-29 DIAGNOSIS — Z01.818 ENCOUNTER FOR OTHER PREPROCEDURAL EXAMINATION: ICD-10-CM

## 2022-04-02 LAB — SARS-COV-2 N GENE NPH QL NAA+PROBE: NOT DETECTED

## 2022-04-05 ENCOUNTER — APPOINTMENT (OUTPATIENT)
Dept: OBGYN | Facility: CLINIC | Age: 42
End: 2022-04-05
Payer: COMMERCIAL

## 2022-04-05 VITALS
WEIGHT: 120 LBS | BODY MASS INDEX: 21.26 KG/M2 | DIASTOLIC BLOOD PRESSURE: 88 MMHG | HEIGHT: 63 IN | TEMPERATURE: 98.9 F | HEART RATE: 120 BPM | SYSTOLIC BLOOD PRESSURE: 164 MMHG

## 2022-04-05 LAB
HCG UR QL: NEGATIVE
QUALITY CONTROL: YES

## 2022-04-05 PROCEDURE — 58558Z: CUSTOM

## 2022-04-05 PROCEDURE — 58300 INSERT INTRAUTERINE DEVICE: CPT

## 2022-04-05 NOTE — PROCEDURE
[IUD Placement] : intrauterine device (IUD) placement [Pain] : pain [Expulsion] : expulsion [Failure] : failure [Uterine Perforation] : uterine perforation [Neg Pregnancy Test] : negative pregnancy test [Betadine] : Betadine [Tenaculum] : Tenaculum [Easy Passage] : Easy passage [Sounded to ___ cm] : sounded to [unfilled] ~Ucm [Mirena IUD] : Mirena IUD [No Complications] : No complications [Hysteroscopy] : Hysteroscopy [Time out performed] : Pre-procedure time out performed.  Patient's name, date of birth and procedure confirmed. [Consent Obtained] : Consent obtained [Abnormal uterine bleeding] : abnormal uterine bleeding [Risks] : risks [Benefits] : benefits [Alternatives] : alternatives [Patient] : patient [Infection] : infection [Bleeding] : bleeding [Allergic Reaction] : allergic reaction [rigid] : Using aseptic technique a hysteroscopy was performed using a rigid hysteroscope [Sent to Pathology] : specimen was placed in buffered formalin and sent for pathology [Antibiotics given] : antibiotics given [Hemostasis obtained] : hemostasis obtained [Tolerated Well] : Patient tolerated the procedure well [Aftercare instructions/regstrictions given and follow-up scheduled] : Aftercare instructions/restrictions given and follow-up scheduled [de-identified] : [id number] [de-identified] : 2024/APRIL [de-identified] : 2029/APRIL [de-identified] : Uterus sounded to 8cm \par single tooth tenaculum applied to anterior cervix\par Da dilators advanced gently \par polypoid tissue seen \par Endosampler used to obtain pathology endometrium\par

## 2022-04-07 ENCOUNTER — NON-APPOINTMENT (OUTPATIENT)
Age: 42
End: 2022-04-07

## 2022-04-07 LAB — CORE LAB BIOPSY: NORMAL

## 2022-04-11 ENCOUNTER — APPOINTMENT (OUTPATIENT)
Dept: OBGYN | Facility: CLINIC | Age: 42
End: 2022-04-11
Payer: COMMERCIAL

## 2022-04-11 VITALS
WEIGHT: 120 LBS | HEART RATE: 81 BPM | BODY MASS INDEX: 21.26 KG/M2 | SYSTOLIC BLOOD PRESSURE: 145 MMHG | HEIGHT: 63 IN | DIASTOLIC BLOOD PRESSURE: 93 MMHG

## 2022-04-11 PROCEDURE — 99213 OFFICE O/P EST LOW 20 MIN: CPT | Mod: 25

## 2022-04-11 PROCEDURE — 76830 TRANSVAGINAL US NON-OB: CPT

## 2022-04-11 NOTE — PROCEDURE
[Abnormal Uterine Bleeding] : abnormal uterine bleeding [Transvaginal Ultrasound] : transvaginal ultrasound [FreeTextEntry3] : iud in place\par follicular cyst ruptured\par copious fluid\par cervix normal [FreeTextEntry5] : 56cc vol,   6mm thick, mirena proper position [FreeTextEntry7] : 27cc, follicular cyst gone [FreeTextEntry8] : absent

## 2022-10-10 ENCOUNTER — APPOINTMENT (OUTPATIENT)
Dept: OBGYN | Facility: CLINIC | Age: 42
End: 2022-10-10

## 2022-10-10 VITALS
WEIGHT: 120 LBS | SYSTOLIC BLOOD PRESSURE: 149 MMHG | BODY MASS INDEX: 21.26 KG/M2 | HEART RATE: 96 BPM | DIASTOLIC BLOOD PRESSURE: 89 MMHG | HEIGHT: 63 IN

## 2022-10-10 DIAGNOSIS — R92.2 INCONCLUSIVE MAMMOGRAM: ICD-10-CM

## 2022-10-10 DIAGNOSIS — N92.6 IRREGULAR MENSTRUATION, UNSPECIFIED: ICD-10-CM

## 2022-10-10 PROCEDURE — 99396 PREV VISIT EST AGE 40-64: CPT | Mod: 25

## 2022-10-10 PROCEDURE — 76830 TRANSVAGINAL US NON-OB: CPT

## 2022-10-10 NOTE — PROCEDURE
[Suspected Ovarian Cyst] : suspected ovarian cyst [Transvaginal Ultrasound] : transvaginal ultrasound [FreeTextEntry3] : IUD IN PLACE\par INCIDENTAL SMALL CYST ON THE RIGHT...LEFT OVARY ABSENT\par FREE FLUID NOTED [FreeTextEntry5] : 62CC VOLUME,   2.5 MM THICJ MIRENA IN PLACE [FreeTextEntry7] : 19.90 CC OVARY...SEPARATE CYST: 15 4 CC [FreeTextEntry8] : ABSENT

## 2022-10-14 LAB — HPV HIGH+LOW RISK DNA PNL CVX: NOT DETECTED

## 2022-10-21 LAB — CYTOLOGY CVX/VAG DOC THIN PREP: NORMAL

## 2022-12-14 NOTE — H&P PST ADULT - WEIGHT IN LBS
Quality 431: Preventive Care And Screening: Unhealthy Alcohol Use - Screening: Patient not identified as an unhealthy alcohol user when screened for unhealthy alcohol use using a systematic screening method Quality 226: Preventive Care And Screening: Tobacco Use: Screening And Cessation Intervention: Patient screened for tobacco use and is an ex/non-smoker Detail Level: Detailed 117.9

## 2023-04-10 ENCOUNTER — APPOINTMENT (OUTPATIENT)
Dept: OBGYN | Facility: CLINIC | Age: 43
End: 2023-04-10
Payer: COMMERCIAL

## 2023-04-10 VITALS
HEART RATE: 89 BPM | BODY MASS INDEX: 21.26 KG/M2 | HEIGHT: 63 IN | WEIGHT: 120 LBS | DIASTOLIC BLOOD PRESSURE: 85 MMHG | SYSTOLIC BLOOD PRESSURE: 139 MMHG

## 2023-04-10 DIAGNOSIS — N83.00 "FOLLICULAR CYST OF OVARY, UNSPECIFIED SIDE": ICD-10-CM

## 2023-04-10 PROCEDURE — 76830 TRANSVAGINAL US NON-OB: CPT

## 2023-04-10 PROCEDURE — 99213 OFFICE O/P EST LOW 20 MIN: CPT | Mod: 25

## 2023-04-10 NOTE — HISTORY OF PRESENT ILLNESS
[FreeTextEntry1] : FEELS WELL SINCE IUD\par STAINING ONLY\par NO PAIN FROM HER ENDO\par ALL IS OK\par HERE TO RE-CHECK CYST

## 2023-04-10 NOTE — PROCEDURE
[Suspected Ovarian Cyst] : suspected ovarian cyst [Transvaginal Ultrasound] : transvaginal ultrasound [FreeTextEntry3] : CYST SMALLER /FREE FLUID\par CERVIX NORMAL\par IUD IN PLACE [FreeTextEntry5] : 74 CC VOL [FreeTextEntry7] : 11 CC  OVARY...CYST:  5 CC, 2 3 CM [FreeTextEntry8] : ABSENT

## 2023-11-06 ENCOUNTER — NON-APPOINTMENT (OUTPATIENT)
Age: 43
End: 2023-11-06

## 2023-11-06 DIAGNOSIS — Z12.39 ENCOUNTER FOR OTHER SCREENING FOR MALIGNANT NEOPLASM OF BREAST: ICD-10-CM

## 2024-04-15 ENCOUNTER — APPOINTMENT (OUTPATIENT)
Dept: OBGYN | Facility: CLINIC | Age: 44
End: 2024-04-15
Payer: COMMERCIAL

## 2024-04-15 VITALS
SYSTOLIC BLOOD PRESSURE: 131 MMHG | WEIGHT: 132 LBS | DIASTOLIC BLOOD PRESSURE: 82 MMHG | HEART RATE: 77 BPM | HEIGHT: 63 IN | BODY MASS INDEX: 23.39 KG/M2

## 2024-04-15 DIAGNOSIS — R39.89 OTHER SYMPTOMS AND SIGNS INVOLVING THE GENITOURINARY SYSTEM: ICD-10-CM

## 2024-04-15 DIAGNOSIS — N80.9 ENDOMETRIOSIS, UNSPECIFIED: ICD-10-CM

## 2024-04-15 DIAGNOSIS — N83.291 OTHER OVARIAN CYST, RIGHT SIDE: ICD-10-CM

## 2024-04-15 DIAGNOSIS — Z01.419 ENCOUNTER FOR GYNECOLOGICAL EXAMINATION (GENERAL) (ROUTINE) W/OUT ABNORMAL FINDINGS: ICD-10-CM

## 2024-04-15 DIAGNOSIS — N85.4 MALPOSITION OF UTERUS: ICD-10-CM

## 2024-04-15 LAB
BILIRUB UR QL STRIP: NORMAL
CLARITY UR: CLEAR
COLLECTION METHOD: NORMAL
GLUCOSE UR-MCNC: NORMAL
HCG UR QL: 0.2 EU/DL
HGB UR QL STRIP.AUTO: ABNORMAL
KETONES UR-MCNC: NORMAL
LEUKOCYTE ESTERASE UR QL STRIP: ABNORMAL
NITRITE UR QL STRIP: NORMAL
PH UR STRIP: 6.5
PROT UR STRIP-MCNC: NORMAL
SP GR UR STRIP: 1

## 2024-04-15 PROCEDURE — 76830 TRANSVAGINAL US NON-OB: CPT

## 2024-04-15 PROCEDURE — 99396 PREV VISIT EST AGE 40-64: CPT | Mod: 25

## 2024-04-15 PROCEDURE — 99459 PELVIC EXAMINATION: CPT

## 2024-04-15 PROCEDURE — 81003 URINALYSIS AUTO W/O SCOPE: CPT | Mod: QW

## 2024-04-15 RX ORDER — TERCONAZOLE 4 MG/G
0.4 CREAM VAGINAL
Qty: 1 | Refills: 1 | Status: COMPLETED | COMMUNITY
Start: 2020-02-20 | End: 2024-04-15

## 2024-04-15 RX ORDER — CIPROFLOXACIN HYDROCHLORIDE 500 MG/1
500 TABLET, FILM COATED ORAL
Qty: 14 | Refills: 0 | Status: COMPLETED | COMMUNITY
Start: 2021-08-06 | End: 2024-04-15

## 2024-04-15 RX ORDER — METRONIDAZOLE 500 MG/1
500 TABLET ORAL TWICE DAILY
Qty: 14 | Refills: 0 | Status: COMPLETED | COMMUNITY
Start: 2020-02-20 | End: 2024-04-15

## 2024-04-15 NOTE — PROCEDURE
[Suspected Ovarian Cyst] : suspected ovarian cyst [Transvaginal Ultrasound] : transvaginal ultrasound [Retroverted] : retroverted [FreeTextEntry3] : copious free fluid small right cyst  [FreeTextEntry5] : 76 cc vol,  7 mm [FreeTextEntry7] : 19.5 cc vol...cystic follicle...2 components [FreeTextEntry8] : absent

## 2024-04-15 NOTE — HISTORY OF PRESENT ILLNESS
[FreeTextEntry1] : here for her annual known endometriosis and mirena inserted...hx of right ov cyst States that she has very light periods, no cramps...feels well in general

## 2024-04-15 NOTE — PHYSICAL EXAM
[Chaperone Present] : A chaperone was present in the examining room during all aspects of the physical examination [[id number]] : A chaperone was present during the pelvic exam. [Examination Of The Breasts] : a normal appearance [No Masses] : no breast masses were palpable [Labia Majora] : normal [Labia Minora] : normal [Normal] : normal [Retroversion] : retroverted [Uterine Adnexae] : normal

## 2024-04-20 LAB
BACTERIA UR CULT: ABNORMAL
C TRACH RRNA SPEC QL NAA+PROBE: NOT DETECTED
HPV HIGH+LOW RISK DNA PNL CVX: NOT DETECTED
N GONORRHOEA RRNA SPEC QL NAA+PROBE: NOT DETECTED
SOURCE AMPLIFICATION: NORMAL

## 2024-04-20 RX ORDER — AMOXICILLIN AND CLAVULANATE POTASSIUM 875; 125 MG/1; MG/1
875-125 TABLET, COATED ORAL
Qty: 14 | Refills: 1 | Status: ACTIVE | COMMUNITY
Start: 2024-04-20 | End: 1900-01-01

## 2024-04-22 ENCOUNTER — NON-APPOINTMENT (OUTPATIENT)
Age: 44
End: 2024-04-22

## 2024-04-27 LAB — CYTOLOGY CVX/VAG DOC THIN PREP: NORMAL

## 2024-10-14 ENCOUNTER — APPOINTMENT (OUTPATIENT)
Dept: OBGYN | Facility: CLINIC | Age: 44
End: 2024-10-14
Payer: COMMERCIAL

## 2024-10-14 VITALS
DIASTOLIC BLOOD PRESSURE: 93 MMHG | WEIGHT: 128 LBS | HEART RATE: 71 BPM | HEIGHT: 63 IN | SYSTOLIC BLOOD PRESSURE: 146 MMHG | BODY MASS INDEX: 22.68 KG/M2

## 2024-10-14 DIAGNOSIS — N85.4 MALPOSITION OF UTERUS: ICD-10-CM

## 2024-10-14 DIAGNOSIS — N80.9 ENDOMETRIOSIS, UNSPECIFIED: ICD-10-CM

## 2024-10-14 DIAGNOSIS — N83.291 OTHER OVARIAN CYST, RIGHT SIDE: ICD-10-CM

## 2024-10-14 DIAGNOSIS — N92.6 IRREGULAR MENSTRUATION, UNSPECIFIED: ICD-10-CM

## 2024-10-14 LAB
BILIRUB UR QL STRIP: NORMAL
CLARITY UR: CLEAR
COLLECTION METHOD: NORMAL
GLUCOSE UR-MCNC: NORMAL
HCG UR QL: 0.2 EU/DL
HGB UR QL STRIP.AUTO: NORMAL
KETONES UR-MCNC: NORMAL
LEUKOCYTE ESTERASE UR QL STRIP: NORMAL
NITRITE UR QL STRIP: NORMAL
PH UR STRIP: 6.5
PROT UR STRIP-MCNC: NORMAL
SP GR UR STRIP: 1

## 2024-10-14 PROCEDURE — 99213 OFFICE O/P EST LOW 20 MIN: CPT | Mod: 25

## 2024-10-14 PROCEDURE — 76830 TRANSVAGINAL US NON-OB: CPT

## 2024-10-14 PROCEDURE — 99459 PELVIC EXAMINATION: CPT

## 2024-10-14 PROCEDURE — 81003 URINALYSIS AUTO W/O SCOPE: CPT | Mod: QW

## 2024-10-14 RX ORDER — LEVONORGESTREL 52 MG/1
INTRAUTERINE DEVICE INTRAUTERINE
Refills: 0 | Status: ACTIVE | COMMUNITY

## 2024-10-14 RX ORDER — NORETHINDRONE AND ETHINYL ESTRADIOL 0.4-0.035
0.4-35 KIT ORAL DAILY
Qty: 1 | Refills: 0 | Status: ACTIVE | COMMUNITY
Start: 2024-10-14 | End: 1900-01-01

## 2024-12-09 ENCOUNTER — APPOINTMENT (OUTPATIENT)
Dept: OBGYN | Facility: CLINIC | Age: 44
End: 2024-12-09
Payer: COMMERCIAL

## 2024-12-09 VITALS
WEIGHT: 132 LBS | BODY MASS INDEX: 23.39 KG/M2 | SYSTOLIC BLOOD PRESSURE: 136 MMHG | HEIGHT: 63 IN | HEART RATE: 75 BPM | DIASTOLIC BLOOD PRESSURE: 82 MMHG

## 2024-12-09 DIAGNOSIS — N83.291 OTHER OVARIAN CYST, RIGHT SIDE: ICD-10-CM

## 2024-12-09 PROCEDURE — 81003 URINALYSIS AUTO W/O SCOPE: CPT | Mod: QW

## 2024-12-09 PROCEDURE — 76830 TRANSVAGINAL US NON-OB: CPT

## 2024-12-09 PROCEDURE — 99213 OFFICE O/P EST LOW 20 MIN: CPT | Mod: 25

## 2024-12-09 PROCEDURE — 99459 PELVIC EXAMINATION: CPT

## 2025-03-31 ENCOUNTER — APPOINTMENT (OUTPATIENT)
Dept: OBGYN | Facility: CLINIC | Age: 45
End: 2025-03-31

## 2025-06-09 ENCOUNTER — APPOINTMENT (OUTPATIENT)
Dept: OBGYN | Facility: CLINIC | Age: 45
End: 2025-06-09
Payer: COMMERCIAL

## 2025-06-09 VITALS
SYSTOLIC BLOOD PRESSURE: 147 MMHG | HEIGHT: 63 IN | BODY MASS INDEX: 22.32 KG/M2 | DIASTOLIC BLOOD PRESSURE: 77 MMHG | WEIGHT: 126 LBS | HEART RATE: 67 BPM

## 2025-06-09 PROBLEM — R39.89 SUSPECTED UTI: Status: ACTIVE | Noted: 2025-06-09 | Resolved: 2025-07-09

## 2025-06-09 LAB
BILIRUB UR QL STRIP: NORMAL
CLARITY UR: CLEAR
COLLECTION METHOD: NORMAL
GLUCOSE UR-MCNC: NORMAL
HCG UR QL: 0.2 EU/DL
HGB UR QL STRIP.AUTO: ABNORMAL
KETONES UR-MCNC: NORMAL
LEUKOCYTE ESTERASE UR QL STRIP: ABNORMAL
NITRITE UR QL STRIP: NORMAL
PH UR STRIP: 7
PROT UR STRIP-MCNC: NORMAL
SP GR UR STRIP: 1.01

## 2025-06-09 PROCEDURE — 76830 TRANSVAGINAL US NON-OB: CPT

## 2025-06-09 PROCEDURE — 99386 PREV VISIT NEW AGE 40-64: CPT | Mod: 25

## 2025-06-09 PROCEDURE — 99459 PELVIC EXAMINATION: CPT

## 2025-06-09 PROCEDURE — 81003 URINALYSIS AUTO W/O SCOPE: CPT | Mod: QW

## 2025-06-15 LAB
BACTERIA UR CULT: ABNORMAL
HPV HIGH+LOW RISK DNA PNL CVX: NOT DETECTED

## 2025-06-16 ENCOUNTER — NON-APPOINTMENT (OUTPATIENT)
Age: 45
End: 2025-06-16